# Patient Record
Sex: MALE | URBAN - METROPOLITAN AREA
[De-identification: names, ages, dates, MRNs, and addresses within clinical notes are randomized per-mention and may not be internally consistent; named-entity substitution may affect disease eponyms.]

---

## 2022-07-01 ENCOUNTER — EMERGENCY (EMERGENCY)
Facility: HOSPITAL | Age: 27
LOS: 1 days | Discharge: HOME | End: 2022-07-01
Attending: EMERGENCY MEDICINE | Admitting: EMERGENCY MEDICINE

## 2022-07-01 VITALS
OXYGEN SATURATION: 100 % | SYSTOLIC BLOOD PRESSURE: 124 MMHG | RESPIRATION RATE: 18 BRPM | HEART RATE: 73 BPM | TEMPERATURE: 98 F | DIASTOLIC BLOOD PRESSURE: 78 MMHG

## 2022-07-01 VITALS
HEART RATE: 82 BPM | SYSTOLIC BLOOD PRESSURE: 116 MMHG | RESPIRATION RATE: 16 BRPM | OXYGEN SATURATION: 100 % | DIASTOLIC BLOOD PRESSURE: 74 MMHG

## 2022-07-01 PROCEDURE — 99283 EMERGENCY DEPT VISIT LOW MDM: CPT

## 2022-07-01 NOTE — ED PROVIDER NOTE - PROGRESS NOTE DETAILS
Robert, PGY2 - Patient stable for discharge. Understands the Emergency Room work-up and discharge precautions. Will follow-up with surgeon

## 2022-07-01 NOTE — ED ADULT NURSE NOTE - OBJECTIVE STATEMENT
Break Coverage RN: Received pt in room 7, pt A&Ox4, respirations even and unlabored b/l. Pt s/p piloidal cyst removal on lower back yesterday AM. Pt c/o continuous bleeding at site. Denies dizziness/lightheadedness at this time. Denies pain at this time, reports last took percocet at 7pm. Surgical site pack and dressed, bleeding controlled. Appears in no apparent distress. Awaiting MD ibarra. Will continue to monitor.

## 2022-07-01 NOTE — ED PROVIDER NOTE - ATTENDING CONTRIBUTION TO CARE
The patient is a 26y Male who denies any past medical and surgery history of PTED BIBEMS for the evaluation of wound site= cyst removal to sacral area that was performed in am 6/30 as described  Pt relates that he received inadequate instructions on how to care for the wound and has had bleeding from the site throughout the day today and has pain to the site.    Vital Signs Last 24 Hrs  T(F): 98.5 HR: 73 BP: 124/78 RR: 18 SpO2: 100% (01 Jul 2022 00:48)   PE: as described; my additions and exceptions are noted in the chart    DATA:  EKG: pending at time of evaluation  LAB: Pending at time of evaluation    IMPRESSION/RISK:  Dx= Post op bleeding    Consideration include: Patient bleeding appears controlled can f/u as outpt outer dressing changed  Plan  as above  RTED for renewed bleeding or fever chills

## 2022-07-01 NOTE — ED PROVIDER NOTE - PATIENT PORTAL LINK FT
You can access the FollowMyHealth Patient Portal offered by [facility] by registering at the following website: [email or web address]. By joining [facility]’s FollowMyHealth portal, you will also be able to view your health information using other applications (apps) compatible with our system.

## 2022-07-01 NOTE — ED ADULT NURSE NOTE - NSIMPLEMENTINTERV_GEN_ALL_ED
Implemented All Fall Risk Interventions:  [place] to call system. Call bell, personal items and telephone within reach. Instruct patient to call for assistance. Room bathroom lighting operational. Non-slip footwear when patient is off stretcher. Physically safe environment: no spills, clutter or unnecessary equipment. Stretcher in lowest position, wheels locked, appropriate side rails in place. Provide visual cue, wrist band, yellow gown, etc. Monitor gait and stability. Monitor for mental status changes and reorient to person, place, and time. Review medications for side effects contributing to fall risk. Reinforce activity limits and safety measures with patient and family.

## 2022-07-01 NOTE — ED PROVIDER NOTE - OBJECTIVE STATEMENT
27yo man with PSx pilonidal cyst removal the morning of 6/30/22 at [facility], presenting 2/2 continued bleeding from surgical site. Before [facility] went to a [facility] where they changed the dressing. Patient states that he had felt a bit dizzy seeing his blood but currently denies chest pain, dizziness, sob.

## 2022-07-01 NOTE — ED ADULT NURSE NOTE - CHIEF COMPLAINT QUOTE
pt BIBEMS. At 0630am yesterday, pt had surg at [place] to remove cyst on lower back (sacral area). "I was not given instructions on how to clean it or take care of it." Surgery site bleeding throughout entire day. pt feeling dizzy, lethargic, c/o pain to site. no PMH  Bleeding controlled by bandages

## 2022-07-01 NOTE — ED PROVIDER NOTE - PHYSICAL EXAMINATION
Gen: NAD, AOx3, able to make needs known, non-toxic  Head: NCAT  HEENT: EOMI, normal conjunctiva  Lung: no respiratory distress, speaking in full sentences  CV: pulses bilaterally   MSK: no visible bony deformities  Neuro: No focal sensory or motor deficits  Skin: Warm, well perfused, no rash. 2in long incision, 2 stitches in total, the middle portion is open and packed with gauze, no active bleeding at this time.   Psych: normal affect

## 2022-07-01 NOTE — ED ADULT TRIAGE NOTE - CHIEF COMPLAINT QUOTE
pt BIBEMS. At 0630am yesterday, pt had surg at [place] to remove cyst on lower back (sacral areal). "I was not given instructions on how to clean it or take care of it." Surgery site bleeding throughout entire day. pt feeling dizzy, lethargic, c/o pain to site. no PMH pt BIBEMS. At 0630am yesterday, pt had surg at [place] to remove cyst on lower back (sacral area). "I was not given instructions on how to clean it or take care of it." Surgery site bleeding throughout entire day. pt feeling dizzy, lethargic, c/o pain to site. no PMH  Bleeding controlled by bandages

## 2022-07-01 NOTE — ED PROVIDER NOTE - NS ED ROS FT
GENERAL: No fever, no chills  EYES: No change in vision  HEENT: No trouble swallowing or speaking  CARDIAC: No chest pain  PULMONARY: No cough, no SOB  GI: No abdominal pain, no nausea, no vomiting, no diarrhea, no constipation  : No changes in urination  SKIN: No rashes  NEURO: No headache, no numbness  MSK: No joint pain  Otherwise as HPI or negative.

## 2022-07-01 NOTE — ED PROVIDER NOTE - NSFOLLOWUPINSTRUCTIONS_ED_ALL_ED_FT
Pilonidal Cyst Excision    WHAT YOU NEED TO KNOW:    A pilonidal cyst excision is the removal of a cyst on your lower back that has become infected or abscessed (collection of pus).    DISCHARGE INSTRUCTIONS:    Seek care immediately if:    You have a fever.    You are bleeding from your surgery area.    You are having severe pain.    Your stitches come apart.    Your surgery area is red, hot, or draining pus.  Call your doctor if:    You have questions or concerns about your condition or care.    Medicines: You may need any of the following:    Prescription pain medicine may be given. Ask your healthcare provider how to take this medicine safely. Some prescription pain medicines contain acetaminophen. Do not take other medicines that contain acetaminophen without talking to your healthcare provider. Too much acetaminophen may cause liver damage. Prescription pain medicine may cause constipation. Ask your healthcare provider how to prevent or treat constipation.    Antibiotics may be given to treat infection caused by bacteria.    Take your medicine as directed. Contact your healthcare provider if you think your medicine is not helping or if you have side effects. Tell him or her if you are allergic to any medicine. Keep a list of the medicines, vitamins, and herbs you take. Include the amounts, and when and why you take them. Bring the list or the pill bottles to follow-up visits. Carry your medicine list with you in case of an emergency.  Self-care:    Care for your procedure area as directed. Keep the area clean and dry. Your healthcare provider will give you instructions for changing your bandage. He or she will tell you when you can bathe or shower.    Do not sit for long periods of time. Limit activities that create tension in your lower back.    Shave hair from around the area. Consider laser hair removal after you have healed.  Follow up with your doctor as directed: Write down your questions so you remember to ask them during your visits. You were seen in the Emergency Room today because of bleeding from your surgical site.   Please follow-up with your General Surgeon as soon as possible.     You can take Tylenol and/or as directed for pain.      WHAT YOU NEED TO KNOW:    A pilonidal cyst excision is the removal of a cyst on your lower back that has become infected or abscessed (collection of pus).    DISCHARGE INSTRUCTIONS:  Seek care immediately if:  You have a fever.  You are bleeding from your surgery area.  You are having severe pain.  Your stitches come apart.  Your surgery area is red, hot, or draining pus.    Call your doctor if:  You have questions or concerns about your condition or care.    Medicines: You may need any of the following:    Prescription pain medicine may be given. Ask your healthcare provider how to take this medicine safely. Some prescription pain medicines contain acetaminophen. Do not take other medicines that contain acetaminophen without talking to your healthcare provider. Too much acetaminophen may cause liver damage. Prescription pain medicine may cause constipation. Ask your healthcare provider how to prevent or treat constipation.    Antibiotics may be given to treat infection caused by bacteria.    Take your medicine as directed. Contact your healthcare provider if you think your medicine is not helping or if you have side effects. Tell him or her if you are allergic to any medicine. Keep a list of the medicines, vitamins, and herbs you take. Include the amounts, and when and why you take them. Bring the list or the pill bottles to follow-up visits. Carry your medicine list with you in case of an emergency.  Self-care:    Care for your procedure area as directed. Keep the area clean and dry. Your healthcare provider will give you instructions for changing your bandage. He or she will tell you when you can bathe or shower.    Do not sit for long periods of time. Limit activities that create tension in your lower back.    Shave hair from around the area. Consider laser hair removal after you have healed.  Follow up with your doctor as directed: Write down your questions so you remember to ask them during your visits.

## 2022-07-03 ENCOUNTER — EMERGENCY (EMERGENCY)
Facility: HOSPITAL | Age: 27
LOS: 1 days | Discharge: HOME | End: 2022-07-03
Attending: EMERGENCY MEDICINE
Payer: MEDICAID

## 2022-07-03 VITALS
WEIGHT: 220.02 LBS | SYSTOLIC BLOOD PRESSURE: 131 MMHG | OXYGEN SATURATION: 96 % | HEIGHT: 70 IN | RESPIRATION RATE: 16 BRPM | DIASTOLIC BLOOD PRESSURE: 74 MMHG | TEMPERATURE: 98 F | HEART RATE: 72 BPM

## 2022-07-03 VITALS
TEMPERATURE: 99 F | DIASTOLIC BLOOD PRESSURE: 85 MMHG | HEART RATE: 55 BPM | SYSTOLIC BLOOD PRESSURE: 115 MMHG | OXYGEN SATURATION: 100 % | RESPIRATION RATE: 18 BRPM

## 2022-07-03 DIAGNOSIS — Z98.890 OTHER SPECIFIED POSTPROCEDURAL STATES: Chronic | ICD-10-CM

## 2022-07-03 PROCEDURE — 99283 EMERGENCY DEPT VISIT LOW MDM: CPT

## 2022-07-03 RX ORDER — IBUPROFEN 200 MG
600 TABLET ORAL ONCE
Refills: 0 | Status: COMPLETED | OUTPATIENT
Start: 2022-07-03 | End: 2022-07-03

## 2022-07-03 RX ORDER — ACETAMINOPHEN 500 MG
650 TABLET ORAL ONCE
Refills: 0 | Status: COMPLETED | OUTPATIENT
Start: 2022-07-03 | End: 2022-07-03

## 2022-07-03 RX ADMIN — Medication 600 MILLIGRAM(S): at 18:01

## 2022-07-03 RX ADMIN — Medication 650 MILLIGRAM(S): at 18:01

## 2022-07-03 NOTE — ED PROVIDER NOTE - NS ED MD DISPO DISCHARGE CCDA
Mildly to Moderately Impaired: difficulty hearing in some environments or speaker may need to increase volume or speak distinctly Patient/Caregiver provided printed discharge information.

## 2022-07-03 NOTE — ED PROVIDER NOTE - OBJECTIVE STATEMENT
Patient is a 25 y/o M who is s/p pilonidal cyst repair Patient is a 27 y/o M who is s/p pilonidal cyst repair on 6/30 at [facility] presenting to the ED with bleeding from surgical site. On same day of surgery went to [facility] and [facility] for pain and bleeding. Packing was done at both facilities. States he has not been able to change packing as he did not have supplied. Was able to change overlaying gauze and tape 1x. Noticed more bleeding from site. Very tender to touch. No fever, chills, n/v, urinary changes. Did defecate today with some pain. Not taking any pain meds.

## 2022-07-03 NOTE — ED PROVIDER NOTE - ATTENDING CONTRIBUTION TO CARE
------------ATTENDING NOTE------------  pt c/o continued constant mild/moderate ache to surgical site from recent pilonidal cyst removal, pain worse w/ dressing changes and sitting, no fevers or infectious symptoms, surgical site well appearing in ED, otherwise benign exam, nml VS, in depth dw pt about ddx, tx, scott, continued close outpt fu.  - Abhijit Daigle MD   ----------------------------------------------

## 2022-07-03 NOTE — ED PROVIDER NOTE - NSFOLLOWUPCLINICS_GEN_ALL_ED_FT
[facility]  General Surgery  [address] [place] [address] - [address] [place] [address]  Phone: [phone]  Fax:

## 2022-07-03 NOTE — ED PROVIDER NOTE - PROGRESS NOTE DETAILS
Edgar Delacruz MD (PGY2): Wound repacked and covcered. Patient concerned about ability to repack wound. No social work on weekends. Considered having patient stay in CDU for pain control and wound management./education. However, not CDU candidate at this time per provider. Will provider patient with ample supplies for wound packing and dressings. Strict return precautions discussed. Wound care and pain control discussed in detail.

## 2022-07-03 NOTE — ED ADULT NURSE NOTE - OBJECTIVE STATEMENT
27 yo M presents to ED A+Ox3 via EMS c/o bleeding at surgical site. Patient had cyst to lower back removed at another hospital on 6/30. States following the procedure, he was seen at 2 other ED, had the site evaluated and a packed dressing placed at [facility] ED which was the last time the dressing was changed. States this morning after getting up from a prone position, he developed an increase in pain and began to have bloody drainage from the site. States he placed a pad on top of the packing but continued to have bleeding at the site. Denies fever, chills. Breathing spontaneous and unlabored on room air. Skin warm dry and of color appropriate for ethnicity. Packing noted to lower back at surgical site, no active drainage noted. Bed in lowest position, side rails up. Patient in position of comfort.

## 2022-07-03 NOTE — ED PROVIDER NOTE - NS ED ROS FT
CONSTITUTIONAL: No fevers, no chills, no lightheadedness, no dizziness  EYES: no visual changes, no eye pain  EARS: no ear drainage, no ear pain, no change in hearing  NOSE: no nasal congestion  MOUTH/THROAT: no sore throat  CV: No chest pain, no palpitations  RESP: No SOB, no cough  GI: No n/v/d, no abd pain  : no dysuria, no hematuria, no flank pain  MSK: see hpi   SKIN: no rashes  NEURO: no headache, no focal weakness, no decreased sensation/parasthesias   PSYCHIATRIC: no known mental health issues

## 2022-07-03 NOTE — ED PROVIDER NOTE - CLINICAL SUMMARY MEDICAL DECISION MAKING FREE TEXT BOX
27 y/o M p/w pain and bleeding from pilonidal cyst repair site on 6/30. No packing change or pain meds since 6/30. Vitals stable. Exam w/o signs of systemic infx. Will provide pain control. Will redress and repack. Will provide packing supplies. General surgery follow-up.

## 2022-07-03 NOTE — ED PROVIDER NOTE - CARE PLAN
Principal Discharge DX:	Pilonidal cyst   1 Principal Discharge DX:	S/P surgical removal of pilonidal cyst  Secondary Diagnosis:	Encounter for postoperative wound check

## 2022-07-03 NOTE — ED PROVIDER NOTE - PATIENT PORTAL LINK FT
You can access the FollowMyHealth Patient Portal offered by [facility] by registering at the following website: [email or web address]. By joining [facility]’s FollowMyHealth portal, you will also be able to view your health information using other applications (apps) compatible with our system.

## 2022-07-03 NOTE — ED PROVIDER NOTE - PHYSICAL EXAMINATION
General: Alert and Orientated x 3. No apparent distress.  Head: Normocephalic and atraumatic.  Eyes: PERRLA with EOMI.  Neck: Supple. Trachea midline.   Cardiac: Normal S1 and S2 w/ RRR. No murmurs appreciated.   Pulmonary: CTA bilaterally. No increased WOB. No wheezes or crackles.  Abdominal: Soft, non-tender. (+) bowel sounds appreciated in all 4 quadrants. No hepatosplenomegaly.   Neurologic: No focal sensory or motor deficits.  Musculoskeletal: Strength appropriate in all 4 extremities for age with no limited ROM.  Back: 3cm long and 1cm wide incision on upper buttock with packing in place. Very TTP.   Skin: Color appropriate for race. Intact, warm, and well-perfused.  Psychiatric: Appropriate mood and affect. No apparent risk to self or others.

## 2022-07-03 NOTE — ED PROVIDER NOTE - NSFOLLOWUPINSTRUCTIONS_ED_ALL_ED_FT
See your Surgery Team this week for follow up -- call to discuss.    Keep wound clean and dry, continue packing changes as previously directed.    See PILONIDAL ABSCESS - INCISION & DRAINAGE information and return instructions given to you.    Seek immediate medical care for new/worsening symptoms/concerns.

## 2022-10-06 PROBLEM — Z78.9 OTHER SPECIFIED HEALTH STATUS: Chronic | Status: ACTIVE | Noted: 2022-07-01

## 2023-07-22 ENCOUNTER — INPATIENT (INPATIENT)
Facility: HOSPITAL | Age: 28
LOS: 1 days | Discharge: HOME | DRG: 605 | End: 2023-07-24
Attending: SURGERY | Admitting: SURGERY
Payer: MEDICAID

## 2023-07-22 VITALS — HEART RATE: 113 BPM | TEMPERATURE: 99 F | WEIGHT: 221.56 LBS | OXYGEN SATURATION: 100 %

## 2023-07-22 DIAGNOSIS — S41.111A LACERATION WITHOUT FOREIGN BODY OF RIGHT UPPER ARM, INITIAL ENCOUNTER: ICD-10-CM

## 2023-07-22 LAB
ALBUMIN SERPL ELPH-MCNC: 2.8 G/DL — LOW (ref 3.3–5)
ALP SERPL-CCNC: 43 U/L — SIGNIFICANT CHANGE UP (ref 40–120)
ALT FLD-CCNC: 18 U/L — SIGNIFICANT CHANGE UP (ref 10–45)
AMPHET UR-MCNC: NEGATIVE — SIGNIFICANT CHANGE UP
ANION GAP SERPL CALC-SCNC: 16 MMOL/L — SIGNIFICANT CHANGE UP (ref 5–17)
ANION GAP SERPL CALC-SCNC: 16 MMOL/L — SIGNIFICANT CHANGE UP (ref 5–17)
APTT BLD: 33.7 SEC — SIGNIFICANT CHANGE UP (ref 27.5–35.5)
AST SERPL-CCNC: 13 U/L — SIGNIFICANT CHANGE UP (ref 10–40)
BARBITURATES UR SCN-MCNC: NEGATIVE — SIGNIFICANT CHANGE UP
BASE EXCESS BLDV CALC-SCNC: -4.2 MMOL/L — LOW (ref -2–3)
BASOPHILS # BLD AUTO: 0.07 K/UL — SIGNIFICANT CHANGE UP (ref 0–0.2)
BASOPHILS NFR BLD AUTO: 0.9 % — SIGNIFICANT CHANGE UP (ref 0–2)
BENZODIAZ UR-MCNC: POSITIVE
BILIRUB SERPL-MCNC: <0.1 MG/DL — LOW (ref 0.2–1.2)
BUN SERPL-MCNC: 12 MG/DL — SIGNIFICANT CHANGE UP (ref 7–23)
BUN SERPL-MCNC: 9 MG/DL — SIGNIFICANT CHANGE UP (ref 7–23)
CA-I SERPL-SCNC: 1.17 MMOL/L — SIGNIFICANT CHANGE UP (ref 1.15–1.33)
CALCIUM SERPL-MCNC: 5.6 MG/DL — CRITICAL LOW (ref 8.4–10.5)
CALCIUM SERPL-MCNC: 8.7 MG/DL — SIGNIFICANT CHANGE UP (ref 8.4–10.5)
CHLORIDE BLDV-SCNC: 106 MMOL/L — SIGNIFICANT CHANGE UP (ref 96–108)
CHLORIDE SERPL-SCNC: 105 MMOL/L — SIGNIFICANT CHANGE UP (ref 96–108)
CHLORIDE SERPL-SCNC: 119 MMOL/L — HIGH (ref 96–108)
CO2 BLDV-SCNC: 24 MMOL/L — SIGNIFICANT CHANGE UP (ref 22–26)
CO2 SERPL-SCNC: 13 MMOL/L — LOW (ref 22–31)
CO2 SERPL-SCNC: 20 MMOL/L — LOW (ref 22–31)
COCAINE METAB.OTHER UR-MCNC: NEGATIVE — SIGNIFICANT CHANGE UP
CREAT SERPL-MCNC: 0.83 MG/DL — SIGNIFICANT CHANGE UP (ref 0.5–1.3)
CREAT SERPL-MCNC: 1.03 MG/DL — SIGNIFICANT CHANGE UP (ref 0.5–1.3)
EGFR: 103 ML/MIN/1.73M2 — SIGNIFICANT CHANGE UP
EGFR: 124 ML/MIN/1.73M2 — SIGNIFICANT CHANGE UP
EOSINOPHIL # BLD AUTO: 0.21 K/UL — SIGNIFICANT CHANGE UP (ref 0–0.5)
EOSINOPHIL NFR BLD AUTO: 2.6 % — SIGNIFICANT CHANGE UP (ref 0–6)
ETHANOL SERPL-MCNC: 143 MG/DL — HIGH (ref 0–10)
GAS PNL BLDA: SIGNIFICANT CHANGE UP
GAS PNL BLDV: 139 MMOL/L — SIGNIFICANT CHANGE UP (ref 136–145)
GAS PNL BLDV: SIGNIFICANT CHANGE UP
GAS PNL BLDV: SIGNIFICANT CHANGE UP
GLUCOSE BLDV-MCNC: 103 MG/DL — HIGH (ref 70–99)
GLUCOSE SERPL-MCNC: 109 MG/DL — HIGH (ref 70–99)
GLUCOSE SERPL-MCNC: 87 MG/DL — SIGNIFICANT CHANGE UP (ref 70–99)
HCO3 BLDV-SCNC: 23 MMOL/L — SIGNIFICANT CHANGE UP (ref 22–29)
HCT VFR BLD CALC: 34.4 % — LOW (ref 39–50)
HCT VFR BLD CALC: 42.7 % — SIGNIFICANT CHANGE UP (ref 39–50)
HCT VFR BLDA CALC: 43 % — SIGNIFICANT CHANGE UP (ref 39–51)
HGB BLD CALC-MCNC: 14.4 G/DL — SIGNIFICANT CHANGE UP (ref 12.6–17.4)
HGB BLD-MCNC: 11.2 G/DL — LOW (ref 13–17)
HGB BLD-MCNC: 13.7 G/DL — SIGNIFICANT CHANGE UP (ref 13–17)
HOROWITZ INDEX BLDV+IHG-RTO: 50 — SIGNIFICANT CHANGE UP
INR BLD: 2.2 RATIO — HIGH (ref 0.88–1.16)
LACTATE BLDV-MCNC: 3.3 MMOL/L — HIGH (ref 0.5–2)
LYMPHOCYTES # BLD AUTO: 2.44 K/UL — SIGNIFICANT CHANGE UP (ref 1–3.3)
LYMPHOCYTES # BLD AUTO: 30.7 % — SIGNIFICANT CHANGE UP (ref 13–44)
MAGNESIUM SERPL-MCNC: 1.8 MG/DL — SIGNIFICANT CHANGE UP (ref 1.6–2.6)
MANUAL SMEAR VERIFICATION: SIGNIFICANT CHANGE UP
MCHC RBC-ENTMCNC: 28.8 PG — SIGNIFICANT CHANGE UP (ref 27–34)
MCHC RBC-ENTMCNC: 29.5 PG — SIGNIFICANT CHANGE UP (ref 27–34)
MCHC RBC-ENTMCNC: 32.1 GM/DL — SIGNIFICANT CHANGE UP (ref 32–36)
MCHC RBC-ENTMCNC: 32.6 GM/DL — SIGNIFICANT CHANGE UP (ref 32–36)
MCV RBC AUTO: 89.7 FL — SIGNIFICANT CHANGE UP (ref 80–100)
MCV RBC AUTO: 90.5 FL — SIGNIFICANT CHANGE UP (ref 80–100)
METHADONE UR-MCNC: NEGATIVE — SIGNIFICANT CHANGE UP
MONOCYTES # BLD AUTO: 0.35 K/UL — SIGNIFICANT CHANGE UP (ref 0–0.9)
MONOCYTES NFR BLD AUTO: 4.4 % — SIGNIFICANT CHANGE UP (ref 2–14)
NEUTROPHILS # BLD AUTO: 4.88 K/UL — SIGNIFICANT CHANGE UP (ref 1.8–7.4)
NEUTROPHILS NFR BLD AUTO: 61.4 % — SIGNIFICANT CHANGE UP (ref 43–77)
NRBC # BLD: 0 /100 WBCS — SIGNIFICANT CHANGE UP (ref 0–0)
OPIATES UR-MCNC: NEGATIVE — SIGNIFICANT CHANGE UP
OXYCODONE UR-MCNC: NEGATIVE — SIGNIFICANT CHANGE UP
PCO2 BLDV: 47 MMHG — SIGNIFICANT CHANGE UP (ref 42–55)
PCP SPEC-MCNC: SIGNIFICANT CHANGE UP
PCP UR-MCNC: NEGATIVE — SIGNIFICANT CHANGE UP
PH BLDV: 7.29 — LOW (ref 7.32–7.43)
PHOSPHATE SERPL-MCNC: 4.8 MG/DL — HIGH (ref 2.5–4.5)
PLAT MORPH BLD: NORMAL — SIGNIFICANT CHANGE UP
PLATELET # BLD AUTO: 249 K/UL — SIGNIFICANT CHANGE UP (ref 150–400)
PLATELET # BLD AUTO: 279 K/UL — SIGNIFICANT CHANGE UP (ref 150–400)
PO2 BLDV: 72 MMHG — HIGH (ref 25–45)
POTASSIUM BLDV-SCNC: 3.5 MMOL/L — SIGNIFICANT CHANGE UP (ref 3.5–5.1)
POTASSIUM SERPL-MCNC: 2.2 MMOL/L — CRITICAL LOW (ref 3.5–5.3)
POTASSIUM SERPL-MCNC: 3.6 MMOL/L — SIGNIFICANT CHANGE UP (ref 3.5–5.3)
POTASSIUM SERPL-SCNC: 2.2 MMOL/L — CRITICAL LOW (ref 3.5–5.3)
POTASSIUM SERPL-SCNC: 3.6 MMOL/L — SIGNIFICANT CHANGE UP (ref 3.5–5.3)
PROT SERPL-MCNC: 4.2 G/DL — LOW (ref 6–8.3)
PROTHROM AB SERPL-ACNC: 25.7 SEC — HIGH (ref 10.5–13.4)
RBC # BLD: 3.8 M/UL — LOW (ref 4.2–5.8)
RBC # BLD: 4.76 M/UL — SIGNIFICANT CHANGE UP (ref 4.2–5.8)
RBC # FLD: 13.5 % — SIGNIFICANT CHANGE UP (ref 10.3–14.5)
RBC # FLD: 13.5 % — SIGNIFICANT CHANGE UP (ref 10.3–14.5)
RBC BLD AUTO: NORMAL — SIGNIFICANT CHANGE UP
SAO2 % BLDV: 93.9 % — HIGH (ref 67–88)
SODIUM SERPL-SCNC: 141 MMOL/L — SIGNIFICANT CHANGE UP (ref 135–145)
SODIUM SERPL-SCNC: 148 MMOL/L — HIGH (ref 135–145)
THC UR QL: NEGATIVE — SIGNIFICANT CHANGE UP
WBC # BLD: 12.34 K/UL — HIGH (ref 3.8–10.5)
WBC # BLD: 7.95 K/UL — SIGNIFICANT CHANGE UP (ref 3.8–10.5)
WBC # FLD AUTO: 12.34 K/UL — HIGH (ref 3.8–10.5)
WBC # FLD AUTO: 7.95 K/UL — SIGNIFICANT CHANGE UP (ref 3.8–10.5)

## 2023-07-22 PROCEDURE — 36620 INSERTION CATHETER ARTERY: CPT

## 2023-07-22 PROCEDURE — 99291 CRITICAL CARE FIRST HOUR: CPT | Mod: 25

## 2023-07-22 PROCEDURE — 31500 INSERT EMERGENCY AIRWAY: CPT

## 2023-07-22 PROCEDURE — 73130 X-RAY EXAM OF HAND: CPT | Mod: 26,RT

## 2023-07-22 PROCEDURE — 72170 X-RAY EXAM OF PELVIS: CPT | Mod: 26

## 2023-07-22 PROCEDURE — 70450 CT HEAD/BRAIN W/O DYE: CPT | Mod: 26,MD

## 2023-07-22 PROCEDURE — 73110 X-RAY EXAM OF WRIST: CPT | Mod: 26,RT

## 2023-07-22 PROCEDURE — 99222 1ST HOSP IP/OBS MODERATE 55: CPT

## 2023-07-22 PROCEDURE — 72125 CT NECK SPINE W/O DYE: CPT | Mod: 26,MD

## 2023-07-22 PROCEDURE — 71045 X-RAY EXAM CHEST 1 VIEW: CPT | Mod: 26

## 2023-07-22 PROCEDURE — 71045 X-RAY EXAM CHEST 1 VIEW: CPT | Mod: 26,77

## 2023-07-22 PROCEDURE — 73090 X-RAY EXAM OF FOREARM: CPT | Mod: 26,LT

## 2023-07-22 RX ORDER — ACETAMINOPHEN 500 MG
1000 TABLET ORAL ONCE
Refills: 0 | Status: COMPLETED | OUTPATIENT
Start: 2023-07-22 | End: 2023-07-22

## 2023-07-22 RX ORDER — ETOMIDATE 2 MG/ML
30 INJECTION INTRAVENOUS ONCE
Refills: 0 | Status: COMPLETED | OUTPATIENT
Start: 2023-07-22 | End: 2023-07-22

## 2023-07-22 RX ORDER — CHLORHEXIDINE GLUCONATE 213 G/1000ML
1 SOLUTION TOPICAL
Refills: 0 | Status: DISCONTINUED | OUTPATIENT
Start: 2023-07-22 | End: 2023-07-23

## 2023-07-22 RX ORDER — DEXMEDETOMIDINE HYDROCHLORIDE IN 0.9% SODIUM CHLORIDE 4 UG/ML
1 INJECTION INTRAVENOUS
Qty: 200 | Refills: 0 | Status: DISCONTINUED | OUTPATIENT
Start: 2023-07-22 | End: 2023-07-22

## 2023-07-22 RX ORDER — TRAMADOL HYDROCHLORIDE 50 MG/1
25 TABLET ORAL EVERY 4 HOURS
Refills: 0 | Status: DISCONTINUED | OUTPATIENT
Start: 2023-07-22 | End: 2023-07-24

## 2023-07-22 RX ORDER — PHENYLEPHRINE HYDROCHLORIDE 10 MG/ML
0.5 INJECTION INTRAVENOUS
Qty: 40 | Refills: 0 | Status: DISCONTINUED | OUTPATIENT
Start: 2023-07-22 | End: 2023-07-22

## 2023-07-22 RX ORDER — CEFAZOLIN SODIUM 1 G
VIAL (EA) INJECTION
Refills: 0 | Status: DISCONTINUED | OUTPATIENT
Start: 2023-07-22 | End: 2023-07-24

## 2023-07-22 RX ORDER — MIDAZOLAM HYDROCHLORIDE 1 MG/ML
4 INJECTION, SOLUTION INTRAMUSCULAR; INTRAVENOUS ONCE
Refills: 0 | Status: DISCONTINUED | OUTPATIENT
Start: 2023-07-22 | End: 2023-07-22

## 2023-07-22 RX ORDER — SODIUM CHLORIDE 9 MG/ML
1000 INJECTION, SOLUTION INTRAVENOUS ONCE
Refills: 0 | Status: COMPLETED | OUTPATIENT
Start: 2023-07-22 | End: 2023-07-22

## 2023-07-22 RX ORDER — TETANUS TOXOID, REDUCED DIPHTHERIA TOXOID AND ACELLULAR PERTUSSIS VACCINE, ADSORBED 5; 2.5; 8; 8; 2.5 [IU]/.5ML; [IU]/.5ML; UG/.5ML; UG/.5ML; UG/.5ML
0.5 SUSPENSION INTRAMUSCULAR ONCE
Refills: 0 | Status: COMPLETED | OUTPATIENT
Start: 2023-07-22 | End: 2023-07-22

## 2023-07-22 RX ORDER — ACETAMINOPHEN 500 MG
650 TABLET ORAL EVERY 6 HOURS
Refills: 0 | Status: DISCONTINUED | OUTPATIENT
Start: 2023-07-22 | End: 2023-07-23

## 2023-07-22 RX ORDER — CEFAZOLIN SODIUM 1 G
2000 VIAL (EA) INJECTION ONCE
Refills: 0 | Status: COMPLETED | OUTPATIENT
Start: 2023-07-22 | End: 2023-07-22

## 2023-07-22 RX ORDER — ROCURONIUM BROMIDE 10 MG/ML
100 VIAL (ML) INTRAVENOUS ONCE
Refills: 0 | Status: COMPLETED | OUTPATIENT
Start: 2023-07-22 | End: 2023-07-22

## 2023-07-22 RX ORDER — HYDROMORPHONE HYDROCHLORIDE 2 MG/ML
1 INJECTION INTRAMUSCULAR; INTRAVENOUS; SUBCUTANEOUS
Refills: 0 | Status: DISCONTINUED | OUTPATIENT
Start: 2023-07-22 | End: 2023-07-22

## 2023-07-22 RX ORDER — POTASSIUM CHLORIDE 20 MEQ
10 PACKET (EA) ORAL
Refills: 0 | Status: COMPLETED | OUTPATIENT
Start: 2023-07-22 | End: 2023-07-22

## 2023-07-22 RX ORDER — CHLORHEXIDINE GLUCONATE 213 G/1000ML
15 SOLUTION TOPICAL EVERY 12 HOURS
Refills: 0 | Status: DISCONTINUED | OUTPATIENT
Start: 2023-07-22 | End: 2023-07-22

## 2023-07-22 RX ORDER — THIAMINE MONONITRATE (VIT B1) 100 MG
100 TABLET ORAL DAILY
Refills: 0 | Status: DISCONTINUED | OUTPATIENT
Start: 2023-07-22 | End: 2023-07-23

## 2023-07-22 RX ORDER — ONDANSETRON 8 MG/1
4 TABLET, FILM COATED ORAL ONCE
Refills: 0 | Status: COMPLETED | OUTPATIENT
Start: 2023-07-22 | End: 2023-07-22

## 2023-07-22 RX ORDER — ROCURONIUM BROMIDE 10 MG/ML
75 VIAL (ML) INTRAVENOUS ONCE
Refills: 0 | Status: COMPLETED | OUTPATIENT
Start: 2023-07-22 | End: 2023-07-22

## 2023-07-22 RX ORDER — HYDROMORPHONE HYDROCHLORIDE 2 MG/ML
1 INJECTION INTRAMUSCULAR; INTRAVENOUS; SUBCUTANEOUS ONCE
Refills: 0 | Status: DISCONTINUED | OUTPATIENT
Start: 2023-07-22 | End: 2023-07-22

## 2023-07-22 RX ORDER — CEFAZOLIN SODIUM 1 G
2000 VIAL (EA) INJECTION EVERY 8 HOURS
Refills: 0 | Status: DISCONTINUED | OUTPATIENT
Start: 2023-07-22 | End: 2023-07-24

## 2023-07-22 RX ORDER — PROPOFOL 10 MG/ML
50 INJECTION, EMULSION INTRAVENOUS
Qty: 1000 | Refills: 0 | Status: DISCONTINUED | OUTPATIENT
Start: 2023-07-22 | End: 2023-07-22

## 2023-07-22 RX ORDER — SODIUM CHLORIDE 9 MG/ML
1000 INJECTION, SOLUTION INTRAVENOUS
Refills: 0 | Status: DISCONTINUED | OUTPATIENT
Start: 2023-07-22 | End: 2023-07-22

## 2023-07-22 RX ORDER — DEXMEDETOMIDINE HYDROCHLORIDE IN 0.9% SODIUM CHLORIDE 4 UG/ML
0.4 INJECTION INTRAVENOUS
Qty: 200 | Refills: 0 | Status: DISCONTINUED | OUTPATIENT
Start: 2023-07-22 | End: 2023-07-22

## 2023-07-22 RX ADMIN — PROPOFOL 30.2 MICROGRAM(S)/KG/MIN: 10 INJECTION, EMULSION INTRAVENOUS at 04:05

## 2023-07-22 RX ADMIN — Medication 100 MILLIEQUIVALENT(S): at 08:31

## 2023-07-22 RX ADMIN — PROPOFOL 30.2 MICROGRAM(S)/KG/MIN: 10 INJECTION, EMULSION INTRAVENOUS at 08:31

## 2023-07-22 RX ADMIN — PHENYLEPHRINE HYDROCHLORIDE 18.8 MICROGRAM(S)/KG/MIN: 10 INJECTION INTRAVENOUS at 08:32

## 2023-07-22 RX ADMIN — HYDROMORPHONE HYDROCHLORIDE 1 MILLIGRAM(S): 2 INJECTION INTRAMUSCULAR; INTRAVENOUS; SUBCUTANEOUS at 09:00

## 2023-07-22 RX ADMIN — HYDROMORPHONE HYDROCHLORIDE 1 MILLIGRAM(S): 2 INJECTION INTRAMUSCULAR; INTRAVENOUS; SUBCUTANEOUS at 06:24

## 2023-07-22 RX ADMIN — TRAMADOL HYDROCHLORIDE 25 MILLIGRAM(S): 50 TABLET ORAL at 22:44

## 2023-07-22 RX ADMIN — MIDAZOLAM HYDROCHLORIDE 4 MILLIGRAM(S): 1 INJECTION, SOLUTION INTRAMUSCULAR; INTRAVENOUS at 03:18

## 2023-07-22 RX ADMIN — ETOMIDATE 30 MILLIGRAM(S): 2 INJECTION INTRAVENOUS at 02:48

## 2023-07-22 RX ADMIN — Medication 100 MILLIGRAM(S): at 13:24

## 2023-07-22 RX ADMIN — Medication 100 MILLIGRAM(S): at 05:01

## 2023-07-22 RX ADMIN — HYDROMORPHONE HYDROCHLORIDE 1 MILLIGRAM(S): 2 INJECTION INTRAMUSCULAR; INTRAVENOUS; SUBCUTANEOUS at 04:04

## 2023-07-22 RX ADMIN — MIDAZOLAM HYDROCHLORIDE 4 MILLIGRAM(S): 1 INJECTION, SOLUTION INTRAMUSCULAR; INTRAVENOUS at 02:52

## 2023-07-22 RX ADMIN — Medication 75 MILLIGRAM(S): at 03:20

## 2023-07-22 RX ADMIN — Medication 400 MILLIGRAM(S): at 23:55

## 2023-07-22 RX ADMIN — SODIUM CHLORIDE 1000 MILLILITER(S): 9 INJECTION, SOLUTION INTRAVENOUS at 04:59

## 2023-07-22 RX ADMIN — Medication 100 MILLIGRAM(S): at 21:13

## 2023-07-22 RX ADMIN — TRAMADOL HYDROCHLORIDE 25 MILLIGRAM(S): 50 TABLET ORAL at 22:14

## 2023-07-22 RX ADMIN — HYDROMORPHONE HYDROCHLORIDE 1 MILLIGRAM(S): 2 INJECTION INTRAMUSCULAR; INTRAVENOUS; SUBCUTANEOUS at 12:01

## 2023-07-22 RX ADMIN — HYDROMORPHONE HYDROCHLORIDE 1 MILLIGRAM(S): 2 INJECTION INTRAMUSCULAR; INTRAVENOUS; SUBCUTANEOUS at 06:23

## 2023-07-22 RX ADMIN — HYDROMORPHONE HYDROCHLORIDE 1 MILLIGRAM(S): 2 INJECTION INTRAMUSCULAR; INTRAVENOUS; SUBCUTANEOUS at 12:15

## 2023-07-22 RX ADMIN — CHLORHEXIDINE GLUCONATE 1 APPLICATION(S): 213 SOLUTION TOPICAL at 06:24

## 2023-07-22 RX ADMIN — CHLORHEXIDINE GLUCONATE 15 MILLILITER(S): 213 SOLUTION TOPICAL at 06:23

## 2023-07-22 RX ADMIN — Medication 100 MILLIEQUIVALENT(S): at 06:25

## 2023-07-22 RX ADMIN — DEXMEDETOMIDINE HYDROCHLORIDE IN 0.9% SODIUM CHLORIDE 25.1 MICROGRAM(S)/KG/HR: 4 INJECTION INTRAVENOUS at 12:45

## 2023-07-22 RX ADMIN — Medication 100 MILLIGRAM(S): at 12:01

## 2023-07-22 RX ADMIN — ONDANSETRON 4 MILLIGRAM(S): 8 TABLET, FILM COATED ORAL at 15:35

## 2023-07-22 RX ADMIN — Medication 100 MILLIEQUIVALENT(S): at 05:00

## 2023-07-22 RX ADMIN — SODIUM CHLORIDE 125 MILLILITER(S): 9 INJECTION, SOLUTION INTRAVENOUS at 08:32

## 2023-07-22 RX ADMIN — HYDROMORPHONE HYDROCHLORIDE 1 MILLIGRAM(S): 2 INJECTION INTRAMUSCULAR; INTRAVENOUS; SUBCUTANEOUS at 06:04

## 2023-07-22 RX ADMIN — Medication 100 MILLIGRAM(S): at 02:48

## 2023-07-22 RX ADMIN — HYDROMORPHONE HYDROCHLORIDE 1 MILLIGRAM(S): 2 INJECTION INTRAMUSCULAR; INTRAVENOUS; SUBCUTANEOUS at 09:15

## 2023-07-22 RX ADMIN — PROPOFOL 30.2 MICROGRAM(S)/KG/MIN: 10 INJECTION, EMULSION INTRAVENOUS at 05:00

## 2023-07-22 RX ADMIN — PHENYLEPHRINE HYDROCHLORIDE 18.8 MICROGRAM(S)/KG/MIN: 10 INJECTION INTRAVENOUS at 06:25

## 2023-07-22 NOTE — H&P ADULT - CRITICAL CARE ATTENDING COMMENT
Level one trauma alert for acute hemorrhage - patient had tourniquet on left arm and significant blood loss reported at scene from laceration to right mid arm  ultimately had to be intubated for combativeness - to facilitate management including establishing I.V. access and to address site of hemorrhage  bleeding controlled at bedside  brought  for CT imaging of head which was negative then to SICU  hand off by myself to SICU team

## 2023-07-22 NOTE — CONSULT NOTE ADULT - ATTENDING COMMENTS
25 year-old male lvl 1 trauma presenting after punching a glass window and sustaining a R forearm laceration, tourniquet placed by EMS for c/f arterial bleed.   Intubated in ER for airway protection, was combative Tourniquet removed, R ventral forearm with 6cm laceration to subcutaneous tissues, x5 additional 1-2cm lacerations, palpable radial/ulnar/DP/PT bilaterally. Also some small R knee and L anterior shin abrasions. S/p 2L bolus,    Intubated paralysed, tachycardiac, will deeply sedate with Propofol, prn Dilaudid for pain. CTH and C spine neg for acute path  Vent and ETT adjustment, tip very high up. F/U CXR ordered.  Hypotensive, a line placed, pt is intravascular depleted with resp variation on A line tracing, responded to fluid bolus  Replace K  Hb improved to 13.7 likely hemoconcentrated, continue to monitor  Abx Cefazolin, rt UE wound sutured and secured  Alco +, add Thiamine  Can start pharm DVT ppx  Trauma scans P

## 2023-07-22 NOTE — ED ADULT NURSE NOTE - NS ED NURSE TRANSPORT WITH
EDT, RN, MD, Resp./Cardiac Monitor/Defib/ACLS/Rescue Kit/O2/BVM/oxygen/pulse ox/ventilator/ACLS Rescue Kit

## 2023-07-22 NOTE — CONSULT NOTE ADULT - ASSESSMENT
ASSESSMENT: 25 year-old male lvl 1 trauma presenting after punching a glass window and sustaining a R forearm laceration, tourniquet placed by EMS for c/f arterial bleed. Upon arrival was combative and thus sedated and intubated, GCS 14. Tourniquet removed, R ventral forearm with 6cm laceration to subcutaneous tissues, x5 additional 1-2cm lacerations, palpable radial/ulnar/DP/PT bilaterally. Also some small R knee and L anterior shin abrasions. S/p 2L bolus, HDS. SICU consulted for intubated/sedated state and higher care requirements. Bedside irrigation and closure of R forearm lacs performed by trauma surgery.    PLAN:   Neurologic:  - Agitated and combative expressing suicidal ideation upon arrival, now sedated/intubated  - Sedation: Propofol 50  - Pain: dilaudid prn    Respiratory:  - PRVC 18/500/5/50  - Initial CXR seemingly wnl, FU read    Cardiovascular:  - No pressors cliff  - HDS, SBP 90-100s  - L radial Starr placed, MAP goal >65  - Lactate 4.8 > 3.3, monitor    Gastrointestinal/Nutrition:  - NPO    Genitourinary/Renal:  - No mIVF cliff, s/p 2L bolus  - Ravi in  - K 2.2, repleting    Hematologic:  - Hgb 11.2 > 13.7, stable  - Low threshold for 1u pRBC  - Monitor H/H  - Holding DVT ppx for now    Infectious Disease:  - Ancef (7/22 - ?)  - Monitor signs of infection    Endocrine:  - Continuous finger sticks    MSK:  - R ventral forearm with 6cm laceration to subcutaneous tissues, x5 additional 1-2cm lacerations, palpable radial/ulnar/DP/PT bilaterally, now s/p irrigation + primary closure  - FU AP pelvis + R forearm XR reads, R wrist and hand XR ordered    Disposition: SICU ASSESSMENT: 25 year-old male lvl 1 trauma presenting after punching a glass window and sustaining a R forearm laceration, tourniquet placed by EMS for c/f arterial bleed. Upon arrival was combative and thus sedated and intubated, GCS 14. Tourniquet removed, R ventral forearm with 6cm laceration to subcutaneous tissues, x5 additional 1-2cm lacerations, palpable radial/ulnar/DP/PT bilaterally. Also some small R knee and L anterior shin abrasions. S/p 2L bolus, HDS. SICU consulted for intubated/sedated state and higher care requirements. Bedside irrigation and closure of R forearm lacs performed by trauma surgery.    PLAN:   Neurologic:  - Agitated and combative expressing suicidal ideation upon arrival, now sedated/intubated  - Sedation: Propofol 50  - Pain: dilaudid prn  - FU CTh + CTcspine read    Respiratory:  - PRVC 18/500/5/50  - Initial CXR seemingly wnl, FU read    Cardiovascular:  - No pressors cliff  - HDS, SBP 90-100s  - L radial Starr placed, MAP goal >65  - Lactate 4.8 > 3.3, monitor    Gastrointestinal/Nutrition:  - NPO    Genitourinary/Renal:  - No mIVF cliff, s/p 2L bolus  - Ravi in  - K 2.2, repleting    Hematologic:  - Hgb 11.2 > 13.7, stable  - Low threshold for 1u pRBC  - Monitor H/H  - Holding DVT ppx for now    Infectious Disease:  - Ancef (7/22 - ?)  - Monitor signs of infection    Endocrine:  - Continuous finger sticks    MSK:  - R ventral forearm with 6cm laceration to subcutaneous tissues, x5 additional 1-2cm lacerations, palpable radial/ulnar/DP/PT bilaterally, now s/p irrigation + primary closure  - FU AP pelvis + R forearm XR reads, R wrist and hand XR ordered    Disposition: SICU ASSESSMENT: 25 year-old male lvl 1 trauma presenting after punching a glass window and sustaining a R forearm laceration, tourniquet placed by EMS for c/f arterial bleed. Upon arrival was combative and thus sedated and intubated, GCS 14. Tourniquet removed, R ventral forearm with 6cm laceration to subcutaneous tissues, x5 additional 1-2cm lacerations, palpable radial/ulnar/DP/PT bilaterally. Also some small R knee and L anterior shin abrasions. S/p 2L bolus, HDS. SICU consulted for intubated/sedated state and higher care requirements. Bedside irrigation and closure of R forearm lacs performed by trauma surgery.    PLAN:   Neurologic:  - Agitated and combative expressing suicidal ideation upon arrival, now sedated/intubated  - EtOH +  - Sedation: Propofol 50  - Pain: dilaudid prn  - FU CTh + CTcspine read    Respiratory:  - PRVC 18/500/5/50  - Initial CXR seemingly wnl, FU read    Cardiovascular:  - No pressors cliff  - HDS, SBP 90-100s  - L radial Starr placed, MAP goal >65  - Lactate 4.8 > 3.3, monitor    Gastrointestinal/Nutrition:  - NPO    Genitourinary/Renal:  - No mIVF cliff, s/p 2L bolus  - Ravi in  - K 2.2, repleting    Hematologic:  - Hgb 11.2 > 13.7, stable  - Low threshold for 1u pRBC  - Monitor H/H  - Holding DVT ppx for now    Infectious Disease:  - Ancef (7/22 - ?)  - Monitor signs of infection    Endocrine:  - Continuous finger sticks    MSK:  - R ventral forearm with 6cm laceration to subcutaneous tissues, x5 additional 1-2cm lacerations, palpable radial/ulnar/DP/PT bilaterally, now s/p irrigation + primary closure  - FU AP pelvis + R forearm XR reads, R wrist and hand XR ordered    Disposition: SICU ASSESSMENT: 25 year-old male lvl 1 trauma presenting after punching a glass window and sustaining a R forearm laceration, tourniquet placed by EMS for c/f arterial bleed. Upon arrival was combative and thus sedated and intubated, GCS 14. Tourniquet removed, R ventral forearm with 6cm laceration to subcutaneous tissues, x5 additional 1-2cm lacerations, palpable radial/ulnar/DP/PT bilaterally. Also some small R knee and L anterior shin abrasions. S/p 2L bolus, HDS. SICU consulted for intubated/sedated state and higher care requirements. Bedside irrigation and closure of R forearm lacs performed by trauma surgery.    PLAN:   Neurologic:  - Agitated and combative expressing suicidal ideation upon arrival, now sedated/intubated  - EtOH +  - Sedation: Propofol 50  - Pain: dilaudid prn  - FU CTh + CTcspine read    Respiratory:  - PRVC 18/500/5/50  - Initial CXR w/ ETT proximal to mohsen >5cm, advanced and CXR repeated, fu read    Cardiovascular:  - Manjit 0.5  - HDS, SBP 90-100s  - L radial Starr placed, MAP goal >65  - Lactate 4.8 > 3.3, monitor    Gastrointestinal/Nutrition:  - NPO    Genitourinary/Renal:  - No mIVF cliff, s/p 2L bolus  - Ravi in  - K 2.2, repleting    Hematologic:  - Hgb 11.2 > 13.7, stable  - Low threshold for 1u pRBC  - Monitor H/H  - Holding DVT ppx for now    Infectious Disease:  - Ancef (7/22 - ?)  - Monitor signs of infection    Endocrine:  - Continuous finger sticks    MSK:  - R ventral forearm with 6cm laceration to subcutaneous tissues, x5 additional 1-2cm lacerations, palpable radial/ulnar/DP/PT bilaterally, now s/p irrigation + primary closure  - FU AP pelvis + R forearm XR reads, R wrist and hand XR ordered    Disposition: SICU
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
## 2023-07-22 NOTE — H&P ADULT - NSHPLABSRESULTS_GEN_ALL_CORE
.  LABS:                         11.2   7.95  )-----------( 249      ( 22 Jul 2023 03:26 )             34.4     07-22    148<H>  |  119<H>  |  9   ----------------------------<  87  2.2<LL>   |  13<L>  |  0.83    Ca    5.6<LL>      22 Jul 2023 03:26    TPro  4.2<L>  /  Alb  2.8<L>  /  TBili  <0.1<L>  /  DBili  x   /  AST  13  /  ALT  18  /  AlkPhos  43  07-22    PT/INR - ( 22 Jul 2023 03:26 )   PT: 25.7 sec;   INR: 2.20 ratio         PTT - ( 22 Jul 2023 03:26 )  PTT:33.7 sec  Urinalysis Basic - ( 22 Jul 2023 03:26 )    Color: x / Appearance: x / SG: x / pH: x  Gluc: 87 mg/dL / Ketone: x  / Bili: x / Urobili: x   Blood: x / Protein: x / Nitrite: x   Leuk Esterase: x / RBC: x / WBC x   Sq Epi: x / Non Sq Epi: x / Bacteria: x            RADIOLOGY, EKG & ADDITIONAL TESTS:   CT Head, C-Spine performed

## 2023-07-22 NOTE — AIRWAY REMOVAL NOTE  ADULT & PEDS - ARTIFICAL AIRWAY REMOVAL COMMENTS
Written order for extubation verified. The patient was identified by full name and birth date compared to the identification band. Present during the procedure was СВЕТЛАНА Virgen.

## 2023-07-22 NOTE — ED PROCEDURE NOTE - PROCEDURE ADDITIONAL DETAILS
Patient was preoxygenated. Induction and paralytic agents given (reference orders). Size 7.5 cuffed endotracheal tube (ETT) was advanced past the vocal cords into the trachea while under direct visualization. ETT was secured at 23cm at the lip and was secured to the patient. Postive end-tidal capnography was obtained. Endotracheal mist was observed. Bilateral breath sounds were heard. Tube placement was confirmed with CXR. -> ett advanced to 27cm based on cxr
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
## 2023-07-22 NOTE — ED PROVIDER NOTE - PHYSICAL EXAMINATION
A primary and secondary survey was performed.     Airway: oropharynx clear  Breathing: normal breath sounds bilaterally, pt phonating well  Circulation: pulses palpated in all 4 limbs, +R forearm deep laceration 6cm with muscle involvemnet, no pulsatile bleeding, NV intact distailly  Disability: Neuro intact / pupils equal round reactive.   Exposure: +C-collar    Gen: pt thrashing, trying to get out of the stretching, being combative, handcuffed  Head: NC,AT. No haley sign/raccoon eyes.   ENT: No hemoTM, oropharynx as above, no nasal hemorrhage.   Neck: as above.   Chest: Lung exam- CTAB, no ttp in chest wall.   Cardiac: RRR S1S2, No JVD.   Abd: soft, non-tender. Normal in color.   Pelvis: Stable.   Ext: +R forearm deep laceration 6cm with muscle involvemnet, no pulsatile bleeding, NV intact distailly  Skin: +R forearm lac  Neuro: GCS 14 , Moving 4 limbs, Speech fluid and clear

## 2023-07-22 NOTE — CHART NOTE - NSCHARTNOTEFT_GEN_A_CORE
The ICU surgery team evaluated the patient at bedside. He was asked whether he has any ideas, intent, or plans to hurt himself. The patient expressed understanding of the questions and denied any ideas, intent or plan to harm himself. He expressed certain difficulties in his personal life, however, they would not drive him to harm himself.

## 2023-07-22 NOTE — ED PROVIDER NOTE - CARE PLAN
1 Principal Discharge DX:	Laceration of arm, right, initial encounter  Secondary Diagnosis:	Altered mental status

## 2023-07-22 NOTE — H&P ADULT - NSHPPHYSICALEXAM_GEN_ALL_CORE
PHYSICAL EXAM:  GENERAL: Sedated, paralyzed  HEAD:  Atraumatic, Normocephalic  EYES: EOMI, PERRLA, conjunctiva and sclera clear  NECK:  C-collar in place  CHEST: No chest wall crepitus, clavicle and sternum intact   HEART: Tachycardic  RESPIRATORY: Intubated, CTA B/L  ABDOMEN: Soft, Nondistended  GROIN: Normal appearing, palpable femoral pulses bilaterally   BACK:  No palpable stepoff or deformity  NEUROLOGY: A&Ox3, nonfocal, moving all extremities prior to sedation  EXTREMITIES:  R ventral forearm with 6cm laceration to subcutaneous tissues, 5 additional 1-2cm lacerations. Small R knee and L anterior shin abrasions.   VASC:  Palp radial, DP, PT bilaterally   SKIN: warm, dry, normal color, no rash or abnormal lesions
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
## 2023-07-22 NOTE — ED PROVIDER NOTE - NS ED MD DISPO ADMITTING SERVICE
SURG Aklief counseling:  Patient advised to apply a pea-sized amount only at bedtime and wait 30 minutes after washing their face before applying.  If too drying, patient may add a non-comedogenic moisturizer.  The most commonly reported side effects including irritation, redness, scaling, dryness, stinging, burning, itching, and increased risk of sunburn.  The patient verbalized understanding of the proper use and possible adverse effects of retinoids.  All of the patient's questions and concerns were addressed.

## 2023-07-22 NOTE — ED PROVIDER NOTE - ATTENDING CONTRIBUTION TO CARE
Attending (Adiel Chilel D.O.): I have personally seen and examined this patient. I have performed a substantive portion of the visit including all aspects of the medical decision making. Resident, Fellow, and/or ACP note reviewed. I agree on the plan of care except where noted.    I have personally provided 31 minutes of critical care concurrently with the resident(s) and/or ACP(s), excluding time spent on separate procedures.    24M here via EMS with tourniquet on RUE 2/2 concern for arterial bleed, reported gcs 10, waxing and waning mental status BP 90/50. 2U emergent blood available bedside. Lv 1 trauma called. RSI meds drawn up. Upon eval of patient, very combating, in handcuffs, yelling, not cooperative. Airway and breathing intact. Unable to obtain BP 2/2 lack of cooperation. Unable to obtain vascular access or obtain vitals so joint decision made for intubation to facilitate medical screening, evaluation of wounds. RSI completed per eMAR, no active bleeding noted from right forearm laceration. 6cm in length. 5 other 1cm lacerations. No other injuries noted on primary or 2ndary survey. Additional versed needed for sedation as well as for control of agitated delirium. Additional pam given at trauma attg discretion while patient in CT scanner. Given 2g ancef, adacel. Plan for xrays , CTH, c-spine, to eval for ich. Admission to SICU for continued care.

## 2023-07-23 LAB
ANION GAP SERPL CALC-SCNC: 10 MMOL/L — SIGNIFICANT CHANGE UP (ref 5–17)
APPEARANCE UR: CLEAR — SIGNIFICANT CHANGE UP
BACTERIA # UR AUTO: NEGATIVE — SIGNIFICANT CHANGE UP
BILIRUB UR-MCNC: NEGATIVE — SIGNIFICANT CHANGE UP
BUN SERPL-MCNC: 9 MG/DL — SIGNIFICANT CHANGE UP (ref 7–23)
CALCIUM SERPL-MCNC: 8 MG/DL — LOW (ref 8.4–10.5)
CHLORIDE SERPL-SCNC: 104 MMOL/L — SIGNIFICANT CHANGE UP (ref 96–108)
CO2 SERPL-SCNC: 23 MMOL/L — SIGNIFICANT CHANGE UP (ref 22–31)
COLOR SPEC: SIGNIFICANT CHANGE UP
CREAT SERPL-MCNC: 1.03 MG/DL — SIGNIFICANT CHANGE UP (ref 0.5–1.3)
DIFF PNL FLD: ABNORMAL
EGFR: 103 ML/MIN/1.73M2 — SIGNIFICANT CHANGE UP
EPI CELLS # UR: 1 /HPF — SIGNIFICANT CHANGE UP
GLUCOSE SERPL-MCNC: 164 MG/DL — HIGH (ref 70–99)
GLUCOSE UR QL: NEGATIVE — SIGNIFICANT CHANGE UP
HCT VFR BLD CALC: 33.5 % — LOW (ref 39–50)
HGB BLD-MCNC: 11 G/DL — LOW (ref 13–17)
HYALINE CASTS # UR AUTO: 2 /LPF — SIGNIFICANT CHANGE UP (ref 0–2)
KETONES UR-MCNC: NEGATIVE — SIGNIFICANT CHANGE UP
LEUKOCYTE ESTERASE UR-ACNC: ABNORMAL
MAGNESIUM SERPL-MCNC: 1.5 MG/DL — LOW (ref 1.6–2.6)
MCHC RBC-ENTMCNC: 29.9 PG — SIGNIFICANT CHANGE UP (ref 27–34)
MCHC RBC-ENTMCNC: 32.8 GM/DL — SIGNIFICANT CHANGE UP (ref 32–36)
MCV RBC AUTO: 91 FL — SIGNIFICANT CHANGE UP (ref 80–100)
NITRITE UR-MCNC: NEGATIVE — SIGNIFICANT CHANGE UP
NRBC # BLD: 0 /100 WBCS — SIGNIFICANT CHANGE UP (ref 0–0)
PH UR: 6.5 — SIGNIFICANT CHANGE UP (ref 5–8)
PHOSPHATE SERPL-MCNC: 2.7 MG/DL — SIGNIFICANT CHANGE UP (ref 2.5–4.5)
PLATELET # BLD AUTO: 216 K/UL — SIGNIFICANT CHANGE UP (ref 150–400)
POTASSIUM SERPL-MCNC: 3.7 MMOL/L — SIGNIFICANT CHANGE UP (ref 3.5–5.3)
POTASSIUM SERPL-SCNC: 3.7 MMOL/L — SIGNIFICANT CHANGE UP (ref 3.5–5.3)
PROT UR-MCNC: SIGNIFICANT CHANGE UP
RBC # BLD: 3.68 M/UL — LOW (ref 4.2–5.8)
RBC # FLD: 13.7 % — SIGNIFICANT CHANGE UP (ref 10.3–14.5)
RBC CASTS # UR COMP ASSIST: 112 /HPF — HIGH (ref 0–4)
SODIUM SERPL-SCNC: 137 MMOL/L — SIGNIFICANT CHANGE UP (ref 135–145)
SP GR SPEC: 1.01 — SIGNIFICANT CHANGE UP (ref 1.01–1.02)
UROBILINOGEN FLD QL: NEGATIVE — SIGNIFICANT CHANGE UP
WBC # BLD: 13.29 K/UL — HIGH (ref 3.8–10.5)
WBC # FLD AUTO: 13.29 K/UL — HIGH (ref 3.8–10.5)
WBC UR QL: 14 /HPF — HIGH (ref 0–5)

## 2023-07-23 PROCEDURE — 99221 1ST HOSP IP/OBS SF/LOW 40: CPT

## 2023-07-23 PROCEDURE — 73110 X-RAY EXAM OF WRIST: CPT | Mod: 26,LT

## 2023-07-23 PROCEDURE — 73090 X-RAY EXAM OF FOREARM: CPT | Mod: 26,LT

## 2023-07-23 PROCEDURE — 99233 SBSQ HOSP IP/OBS HIGH 50: CPT

## 2023-07-23 PROCEDURE — 73120 X-RAY EXAM OF HAND: CPT | Mod: 26,LT

## 2023-07-23 RX ORDER — MAGNESIUM SULFATE 500 MG/ML
2 VIAL (ML) INJECTION ONCE
Refills: 0 | Status: COMPLETED | OUTPATIENT
Start: 2023-07-23 | End: 2023-07-23

## 2023-07-23 RX ORDER — TRAMADOL HYDROCHLORIDE 50 MG/1
50 TABLET ORAL EVERY 4 HOURS
Refills: 0 | Status: DISCONTINUED | OUTPATIENT
Start: 2023-07-23 | End: 2023-07-24

## 2023-07-23 RX ORDER — ACETAMINOPHEN 500 MG
975 TABLET ORAL EVERY 6 HOURS
Refills: 0 | Status: DISCONTINUED | OUTPATIENT
Start: 2023-07-23 | End: 2023-07-24

## 2023-07-23 RX ORDER — MAGNESIUM SULFATE 500 MG/ML
4 VIAL (ML) INJECTION ONCE
Refills: 0 | Status: DISCONTINUED | OUTPATIENT
Start: 2023-07-23 | End: 2023-07-23

## 2023-07-23 RX ORDER — POTASSIUM PHOSPHATE, MONOBASIC POTASSIUM PHOSPHATE, DIBASIC 236; 224 MG/ML; MG/ML
30 INJECTION, SOLUTION INTRAVENOUS ONCE
Refills: 0 | Status: COMPLETED | OUTPATIENT
Start: 2023-07-23 | End: 2023-07-23

## 2023-07-23 RX ORDER — THIAMINE MONONITRATE (VIT B1) 100 MG
100 TABLET ORAL DAILY
Refills: 0 | Status: DISCONTINUED | OUTPATIENT
Start: 2023-07-23 | End: 2023-07-24

## 2023-07-23 RX ADMIN — Medication 975 MILLIGRAM(S): at 09:00

## 2023-07-23 RX ADMIN — CHLORHEXIDINE GLUCONATE 1 APPLICATION(S): 213 SOLUTION TOPICAL at 05:03

## 2023-07-23 RX ADMIN — Medication 25 GRAM(S): at 04:05

## 2023-07-23 RX ADMIN — Medication 100 MILLIGRAM(S): at 05:01

## 2023-07-23 RX ADMIN — Medication 100 MILLIGRAM(S): at 13:32

## 2023-07-23 RX ADMIN — TRAMADOL HYDROCHLORIDE 25 MILLIGRAM(S): 50 TABLET ORAL at 03:40

## 2023-07-23 RX ADMIN — POTASSIUM PHOSPHATE, MONOBASIC POTASSIUM PHOSPHATE, DIBASIC 83.33 MILLIMOLE(S): 236; 224 INJECTION, SOLUTION INTRAVENOUS at 03:12

## 2023-07-23 RX ADMIN — Medication 100 MILLIGRAM(S): at 13:33

## 2023-07-23 RX ADMIN — Medication 1000 MILLIGRAM(S): at 00:25

## 2023-07-23 RX ADMIN — TRAMADOL HYDROCHLORIDE 25 MILLIGRAM(S): 50 TABLET ORAL at 03:10

## 2023-07-23 RX ADMIN — Medication 100 MILLIGRAM(S): at 21:40

## 2023-07-23 RX ADMIN — Medication 975 MILLIGRAM(S): at 08:23

## 2023-07-23 RX ADMIN — Medication 975 MILLIGRAM(S): at 18:06

## 2023-07-23 RX ADMIN — Medication 25 GRAM(S): at 02:37

## 2023-07-23 NOTE — BH CONSULTATION LIAISON ASSESSMENT NOTE - NSBHCONSULTFOLLOWAFTERCARE_PSY_A_CORE FT
Can follow up with [facility] crisis center on corner of [address] in [place] ([phone]) if needed Can follow up with [facility] crisis center on corner of [address] in [place] ([phone]) if needed; information also provided to pt's mother

## 2023-07-23 NOTE — BH CONSULTATION LIAISON ASSESSMENT NOTE - NSBHCONSULTPRIMARYDISCUSSYES_PSY_A_CORE FT
Discussed w/ Dr. Smith that pt does not meet criteria for inpatient psychiatric admission, does not require 1 to 1 observation at this time.
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
## 2023-07-23 NOTE — BH CONSULTATION LIAISON ASSESSMENT NOTE - DESCRIPTION
Domiciled in shelter in [place], but has family nearby. His mother and aunt live in [place]. Been on and off with his current girlfriend for about 5 years. States he drinks when they are together, does not elaborate on how much he drinks. He is , and has a daughter who lives in a different state who is 12 years old, it is unclear if the daughter is from his ex-wife or from a different relationship. Denies smoking.
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
## 2023-07-23 NOTE — PROGRESS NOTE ADULT - ATTENDING COMMENTS
7/22/2023 - bedside repair of right forearm volar superficial lacerations    altered mental status  -intubated and successfully extubated on 7/22    alcohol abuse  -no evidence of alcohol withdrawal, so will transfer to floor  -social work evaluation
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
## 2023-07-23 NOTE — BH CONSULTATION LIAISON ASSESSMENT NOTE - NSCOMMENTSUICRISKFACT_PSY_ALL_CORE
Lives in shelter, involved in a relationship where both parties encourage each other to drink alcohol to excess

## 2023-07-23 NOTE — OCCUPATIONAL THERAPY INITIAL EVALUATION ADULT - LIVES WITH, PROFILE
Pt reports living alone in shelter with elevator access. Pt reports independence with all aspects of self care and functional mobility without AD PTA.

## 2023-07-23 NOTE — BH CONSULTATION LIAISON ASSESSMENT NOTE - CURRENT MEDICATION
MEDICATIONS  (STANDING):  ceFAZolin   IVPB      ceFAZolin   IVPB 2000 milliGRAM(s) IV Intermittent every 8 hours  thiamine 100 milliGRAM(s) Oral daily    MEDICATIONS  (PRN):  acetaminophen     Tablet .. 975 milliGRAM(s) Oral every 6 hours PRN Mild Pain (1 - 3)  traMADol 50 milliGRAM(s) Oral every 4 hours PRN Severe Pain (7 - 10)  traMADol 25 milliGRAM(s) Oral every 4 hours PRN Moderate Pain (4 - 6)
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
## 2023-07-23 NOTE — BH CONSULTATION LIAISON ASSESSMENT NOTE - NSBHCONSULTRECOMMENDOTHER_PSY_A_CORE FT
- Routine observation sufficient, as pt at low acute risk of suicide, denying SI now and endorsed SI only while under the influence of alcohol.   - Does not meet criteria for inpatient psychiatric admission at this time.   - Pt and mother counseled on how sobriety is protective, as both pt and pt's mother acknowledge that pt is more at risk of self harm and harm to others when he is drinking.
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
## 2023-07-23 NOTE — BH CONSULTATION LIAISON ASSESSMENT NOTE - NSBHCHARTREVIEWVS_PSY_A_CORE FT
Vital Signs Last 24 Hrs  T(C): 36.7 (23 Jul 2023 10:15), Max: 38.3 (22 Jul 2023 23:00)  T(F): 98.1 (23 Jul 2023 10:15), Max: 100.9 (22 Jul 2023 23:00)  HR: 79 (23 Jul 2023 14:48) (73 - 103)  BP: 103/63 (23 Jul 2023 14:48) (92/56 - 115/57)  BP(mean): 68 (23 Jul 2023 08:00) (68 - 79)  RR: 18 (23 Jul 2023 10:15) (13 - 23)  SpO2: 96% (23 Jul 2023 14:48) (94% - 98%)    Parameters below as of 23 Jul 2023 14:48  Patient On (Oxygen Delivery Method): room air

## 2023-07-23 NOTE — BH CONSULTATION LIAISON ASSESSMENT NOTE - MSE UNSTRUCTURED FT
The patient appears stated age, fair hygiene, dressed appropriately. He was calm, cooperative with the interview. He maintained appropriate eye contact. No psychomotor agitation or retardation. The patient's speech was fluent, low in tone, rate, and volume. The patient's mood is "alright." Affect is constricted, stable, appropriate. The patient's thoughts are goal directed. He denies any suicidal or homicidal ideation, intent, or plan on this interview. Insight is fair. Judgement is fair. Impulse control is poor.  The patient appears stated age, fair hygiene, dressed appropriately. He was calm, cooperative with the interview. He maintained appropriate eye contact. No psychomotor agitation or retardation. The patient's speech was fluent, low in tone, rate, and volume. The patient's mood is "alright." Affect is constricted, stable, appropriate. The patient's thoughts are goal directed. He denies any suicidal or homicidal ideation, intent, or plan on this interview. Insight is fair. Judgement is fair.

## 2023-07-24 ENCOUNTER — TRANSCRIPTION ENCOUNTER (OUTPATIENT)
Age: 28
End: 2023-07-24

## 2023-07-24 VITALS
RESPIRATION RATE: 18 BRPM | DIASTOLIC BLOOD PRESSURE: 65 MMHG | SYSTOLIC BLOOD PRESSURE: 109 MMHG | TEMPERATURE: 98 F | HEART RATE: 77 BPM | OXYGEN SATURATION: 98 %

## 2023-07-24 LAB
ANION GAP SERPL CALC-SCNC: 7 MMOL/L — SIGNIFICANT CHANGE UP (ref 5–17)
BUN SERPL-MCNC: 8 MG/DL — SIGNIFICANT CHANGE UP (ref 7–23)
CALCIUM SERPL-MCNC: 8.2 MG/DL — LOW (ref 8.4–10.5)
CHLORIDE SERPL-SCNC: 106 MMOL/L — SIGNIFICANT CHANGE UP (ref 96–108)
CO2 SERPL-SCNC: 26 MMOL/L — SIGNIFICANT CHANGE UP (ref 22–31)
CREAT SERPL-MCNC: 0.98 MG/DL — SIGNIFICANT CHANGE UP (ref 0.5–1.3)
CULTURE RESULTS: SIGNIFICANT CHANGE UP
EGFR: 107 ML/MIN/1.73M2 — SIGNIFICANT CHANGE UP
GLUCOSE SERPL-MCNC: 125 MG/DL — HIGH (ref 70–99)
HCT VFR BLD CALC: 31.4 % — LOW (ref 39–50)
HGB BLD-MCNC: 10.2 G/DL — LOW (ref 13–17)
MAGNESIUM SERPL-MCNC: 1.7 MG/DL — SIGNIFICANT CHANGE UP (ref 1.6–2.6)
MCHC RBC-ENTMCNC: 29.7 PG — SIGNIFICANT CHANGE UP (ref 27–34)
MCHC RBC-ENTMCNC: 32.5 GM/DL — SIGNIFICANT CHANGE UP (ref 32–36)
MCV RBC AUTO: 91.5 FL — SIGNIFICANT CHANGE UP (ref 80–100)
NRBC # BLD: 0 /100 WBCS — SIGNIFICANT CHANGE UP (ref 0–0)
PHOSPHATE SERPL-MCNC: 3 MG/DL — SIGNIFICANT CHANGE UP (ref 2.5–4.5)
PLATELET # BLD AUTO: 202 K/UL — SIGNIFICANT CHANGE UP (ref 150–400)
POTASSIUM SERPL-MCNC: 3.8 MMOL/L — SIGNIFICANT CHANGE UP (ref 3.5–5.3)
POTASSIUM SERPL-SCNC: 3.8 MMOL/L — SIGNIFICANT CHANGE UP (ref 3.5–5.3)
RBC # BLD: 3.43 M/UL — LOW (ref 4.2–5.8)
RBC # FLD: 13.8 % — SIGNIFICANT CHANGE UP (ref 10.3–14.5)
SODIUM SERPL-SCNC: 139 MMOL/L — SIGNIFICANT CHANGE UP (ref 135–145)
SPECIMEN SOURCE: SIGNIFICANT CHANGE UP
WBC # BLD: 8.41 K/UL — SIGNIFICANT CHANGE UP (ref 3.8–10.5)
WBC # FLD AUTO: 8.41 K/UL — SIGNIFICANT CHANGE UP (ref 3.8–10.5)

## 2023-07-24 RX ORDER — TRAMADOL HYDROCHLORIDE 50 MG/1
0.5 TABLET ORAL
Qty: 8 | Refills: 0
Start: 2023-07-24

## 2023-07-24 RX ADMIN — TRAMADOL HYDROCHLORIDE 25 MILLIGRAM(S): 50 TABLET ORAL at 01:39

## 2023-07-24 RX ADMIN — TRAMADOL HYDROCHLORIDE 25 MILLIGRAM(S): 50 TABLET ORAL at 11:18

## 2023-07-24 RX ADMIN — Medication 975 MILLIGRAM(S): at 04:00

## 2023-07-24 RX ADMIN — Medication 975 MILLIGRAM(S): at 09:27

## 2023-07-24 RX ADMIN — Medication 100 MILLIGRAM(S): at 05:10

## 2023-07-24 RX ADMIN — TRAMADOL HYDROCHLORIDE 25 MILLIGRAM(S): 50 TABLET ORAL at 02:30

## 2023-07-24 RX ADMIN — Medication 975 MILLIGRAM(S): at 10:27

## 2023-07-24 RX ADMIN — Medication 100 MILLIGRAM(S): at 13:04

## 2023-07-24 RX ADMIN — Medication 100 MILLIGRAM(S): at 11:18

## 2023-07-24 RX ADMIN — TRAMADOL HYDROCHLORIDE 25 MILLIGRAM(S): 50 TABLET ORAL at 12:18

## 2023-07-24 RX ADMIN — Medication 975 MILLIGRAM(S): at 03:07

## 2023-07-24 NOTE — DISCHARGE NOTE NURSING/CASE MANAGEMENT/SOCIAL WORK - NSDCFUADDAPPT_GEN_ALL_CORE_FT
- You can follow up with [facility] crisis center on corner of [address] in [place] ([phone]) if needed

## 2023-07-24 NOTE — DISCHARGE NOTE PROVIDER - NSDCCPCAREPLAN_GEN_ALL_CORE_FT
PRINCIPAL DISCHARGE DIAGNOSIS  Diagnosis: Laceration of arm, right, initial encounter  Assessment and Plan of Treatment: WOUND CARE: Your wound is closed with nylon sutures. These are non-dissolvable and need to be removed in clinic.  BATHING: Please do not submerge wound underwater. You may shower and/or sponge bathe.  ACTIVITY: No heavy lifting anything more than 10-15lbs or straining. Otherwise, you may return to your usual level of physical activity. If you are taking narcotic pain medication (such as Percocet), do NOT drive a car, operate machinery or make important decisions.  DIET: Regular diet  NOTIFY YOUR SURGEON IF: You have any bleeding that does not stop, any pus draining from your wound, any fever (over 100.4 F) or chills, persistent nausea/vomiting with inability to tolerate food or liquids, persistent diarrhea, or if your pain is not controlled on your discharge pain medications.  FOLLOW-UP:  1. Please follow up with your primary care physician in one week regarding your hospitalization.  2. Please follow up with [facility] surgery team in 2 weeks for suture removal.        SECONDARY DISCHARGE DIAGNOSES  Diagnosis: Altered mental status  Assessment and Plan of Treatment:      PRINCIPAL DISCHARGE DIAGNOSIS  Diagnosis: Laceration of arm, right, initial encounter  Assessment and Plan of Treatment: WOUND CARE: Your wound is closed with nylon sutures. These are non-dissolvable and need to be removed in clinic.  BATHING: Please do not submerge wound underwater. You may shower and/or sponge bathe.  ACTIVITY: No heavy lifting anything more than 10-15lbs or straining. Otherwise, you may return to your usual level of physical activity. If you are taking narcotic pain medication (such as oxycodone), do NOT drive a car, operate machinery or make important decisions.  DIET: Regular diet  NOTIFY YOUR SURGEON IF: You have any bleeding that does not stop, any pus draining from your wound, any fever (over 100.4 F) or chills, persistent nausea/vomiting with inability to tolerate food or liquids, persistent diarrhea, or if your pain is not controlled on your discharge pain medications.  FOLLOW-UP:  1. Please follow up with your primary care physician in one week regarding your hospitalization.  2. Please follow up with [facility] surgery team in 2 weeks for suture removal.        SECONDARY DISCHARGE DIAGNOSES  Diagnosis: Altered mental status  Assessment and Plan of Treatment:

## 2023-07-24 NOTE — DISCHARGE NOTE PROVIDER - CARE PROVIDER_API CALL
Aureliano Cunningham Dic[facility]  Surgery  [address] [place] [id number]  Phone: [phone]  Fax: [phone]  Follow Up Time: 2 weeks

## 2023-07-24 NOTE — DISCHARGE NOTE PROVIDER - NSDCFUADDAPPT_GEN_ALL_CORE_FT
- You can follow up with [facility] crisis center on corner of [address] in [place] ([phone]) if needed; information also provided to pt's mother   - You can follow up with [facility] crisis center on corner of [address] in [place] ([phone]) if needed

## 2023-07-24 NOTE — DISCHARGE NOTE NURSING/CASE MANAGEMENT/SOCIAL WORK - PATIENT PORTAL LINK FT
You can access the FollowMyHealth Patient Portal offered by [facility] by registering at the following website: [email or web address]. By joining [employer]’s FollowMyHealth portal, you will also be able to view your health information using other applications (apps) compatible with our system.

## 2023-07-24 NOTE — DISCHARGE NOTE NURSING/CASE MANAGEMENT/SOCIAL WORK - NSDCPEFALRISK_GEN_ALL_CORE
For information on Fall & Injury Prevention, visit: [email or web address] OR  [email or web address] OR  https://www.cdc.gov/steadi/patient.html
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
## 2023-07-24 NOTE — PROGRESS NOTE ADULT - SUBJECTIVE AND OBJECTIVE BOX
SURGERY    Pt seen and examined. Pt denies any overnight events. Pt reports pain is well controlled.  Pt downgraded from SICU    ICU Vital Signs Last 24 Hrs  T(C): 36.7 (2023 10:15), Max: 38.3 (2023 23:00)  T(F): 98.1 (2023 10:15), Max: 100.9 (2023 23:00)  HR: 73 (2023 10:15) (72 - 103)  BP: 98/54 (2023 10:15) (92/56 - 115/57)  BP(mean): 68 (2023 08:00) (68 - 79)  ABP: 99/49 (2023 18:00) (98/44 - 143/79)  ABP(mean): 63 (2023 18:00) (59 - 97)  RR: 18 (2023 10:15) (11 - 33)  SpO2: 96% (2023 10:15) (94% - 100%)      I&O's Detail    2023 07:01  -  2023 07:00  --------------------------------------------------------  IN:    Dexmedetomidine: 30.1 mL    dextrose 5% + lactated ringers: 1250 mL    IV PiggyBack: 500 mL    IV PiggyBack: 499.8 mL    Oral Fluid: 250 mL    Phenylephrine: 33.2 mL    Propofol: 151 mL  Total IN: 2714.1 mL    OUT:    Indwelling Catheter - Urethral (mL): 805 mL    Voided (mL): 1400 mL  Total OUT: 2205 mL    Total NET: 509.1 mL      2023 07:01  -  2023 11:25  --------------------------------------------------------  IN:    IV PiggyBack: 83.3 mL    Oral Fluid: 150 mL  Total IN: 233.3 mL    OUT:    Voided (mL): 825 mL  Total OUT: 825 mL    Total NET: -591.7 mL      Physical exam:   Gen - Pt sitting comfortably in bed in NAD  Chest- CTA bilaterally   CV- S1 & S2, RRR  Abdomen- Soft, non-tender, non-distended  Ext - right forearm dressed and wrapped, clean, intact sensation, normal cap refill, normal motion at shoulder and elbow, decreased strength in hand 2/2 pain    LABS:                        11.0   13.29 )-----------( 216      ( 2023 00:44 )             33.5     07-23    137  |  104  |  9   ----------------------------<  164<H>  3.7   |  23  |  1.03    Ca    8.0<L>      2023 00:45  Phos  2.7     07-23  Mg     1.5     07-23    TPro  4.2<L>  /  Alb  2.8<L>  /  TBili  <0.1<L>  /  DBili  x   /  AST  13  /  ALT  18  /  AlkPhos  43  07-22    PT/INR - ( 2023 03:26 )   PT: 25.7 sec;   INR: 2.20 ratio         PTT - ( 2023 03:26 )  PTT:33.7 sec  Urinalysis Basic - ( 2023 03:06 )    Color: Light Yellow / Appearance: Clear / S.014 / pH: x  Gluc: x / Ketone: Negative  / Bili: Negative / Urobili: Negative   Blood: x / Protein: Trace / Nitrite: Negative   Leuk Esterase: Large / RBC: 112 /hpf / WBC 14 /HPF   Sq Epi: x / Non Sq Epi: x / Bacteria: Negative      Assessment/Plan:   24-year-old male unknown PMH presenting as a level 1 trauma after punching a window. Injuries thus far include multiple laceration to R volar forearm, including 6cm laceration with exposed subcutaneous tissue and muscle s/p bedside lac repair    - pending  consult  -Continue DVT Prophylaxis with SCD's  -Continue IV Antibiotics ancef (f/u ID for duration)  -Encourage IS  -Continue analgesia  -Encourage OOB/ambulation with assistance  -Diet: regular  -Above discussed with Dr. Cunningham    [id number]
SURGERY DAILY PROGRESS NOTE:     Overnight Events:  No acute events overnight.    SUBJECTIVE: Patient seen and evaluated on AM rounds. Pt is resting comfortably in bed with no complaints. Denies fever, chills, N/V, chest pain, or shortness of breath. Tolerating diet. Ambulating well. Pain is adequately controlled on current regimen. Able to move right arm with some soreness.    OBJECTIVE:  Vital Signs Last 24 Hrs  T(C): 36.7 (24 Jul 2023 04:10), Max: 36.7 (23 Jul 2023 10:15)  T(F): 98.1 (24 Jul 2023 04:10), Max: 98.1 (23 Jul 2023 10:15)  HR: 68 (24 Jul 2023 04:10) (68 - 79)  BP: 119/74 (24 Jul 2023 04:10) (98/54 - 119/74)  BP(mean): --  RR: 18 (24 Jul 2023 04:10) (18 - 18)  SpO2: 96% (24 Jul 2023 04:10) (96% - 98%)    Parameters below as of 24 Jul 2023 04:10  Patient On (Oxygen Delivery Method): room air      I&O's Detail    23 Jul 2023 07:01  -  24 Jul 2023 07:00  --------------------------------------------------------  IN:    IV PiggyBack: 83.3 mL    Oral Fluid: 800 mL  Total IN: 883.3 mL    OUT:    Voided (mL): 825 mL  Total OUT: 825 mL    Total NET: 58.3 mL        Daily     Daily     LABS:                        10.2   8.41  )-----------( 202      ( 24 Jul 2023 07:39 )             31.4     07-24    139  |  106  |  8   ----------------------------<  125<H>  3.8   |  26  |  0.98    Ca    8.2<L>      24 Jul 2023 07:39  Phos  3.0     07-24  Mg     1.7     07-24        Urinalysis Basic - ( 24 Jul 2023 07:39 )    Color: x / Appearance: x / SG: x / pH: x  Gluc: 125 mg/dL / Ketone: x  / Bili: x / Urobili: x   Blood: x / Protein: x / Nitrite: x   Leuk Esterase: x / RBC: x / WBC x   Sq Epi: x / Non Sq Epi: x / Bacteria: x        PHYSICAL EXAM:  Gen - Pt laying comfortably in bed in NAD  Chest- CTA bilaterally   CV- S1 & S2, RRR  Abdomen- Soft, non-tender, non-distended  Ext - right forearm dressed and wrapped, clean, intact sensation, normal cap refill, normal motion at shoulder and elbow, decreased strength in hand 2/2 pain
24 HOUR EVENTS:    SUBJECTIVE/ROS:  [ ] A ten-point review of systems was otherwise negative except as noted.  [ ] Due to altered mental status/intubation, subjective information were not able to be obtained from the patient. History was obtained, to the extent possible, from review of the chart and collateral sources of information.    NEURO  Exam: AO4  Meds: acetaminophen     Tablet .. 650 milliGRAM(s) Oral every 6 hours PRN Mild Pain (1 - 3)  traMADol 25 milliGRAM(s) Oral every 4 hours PRN Moderate Pain (4 - 6)  traMADol 50 milliGRAM(s) Oral every 4 hours PRN Severe Pain (7 - 10)    [x] Adequacy of sedation and pain control has been assessed and adjusted    RESPIRATORY  RR: 20 (07-23-23 @ 04:00) (11 - 38)  SpO2: 96% (07-23-23 @ 04:00) (94% - 100%)  Wt(kg): --  Exam: CTA b/l. No murmurs, rubs, gallops appreciated.   Mechanical Ventilation: Mode: CPAP with PS, RR (patient): 18, FiO2: 40, PEEP: 5, PS: 5, MAP: 7, PIP: 11  ABG - ( 22 Jul 2023 13:40 )  pH: 7.36  /  pCO2: 49    /  pO2: 171   / HCO3: 28    / Base Excess: 1.5   /  SaO2: 99.7    Lactate: x        [ ] Extubation Readiness Assessed  Meds:     CARDIOVASCULAR  HR: 88 (07-23-23 @ 04:00) (65 - 104)  BP: 110/58 (07-23-23 @ 04:00) (87/38 - 124/76)  BP(mean): 75 (07-23-23 @ 04:00) (55 - 95)  ABP: 99/49 (07-22-23 @ 18:00) (55/38 - 145/92)  ABP(mean): 63 (07-22-23 @ 18:00) (54 - 112)  Wt(kg): --  CVP(cm H2O): --  VBG - ( 22 Jul 2023 03:57 )  pH: 7.29  /  pCO2: 47    /  pO2: 72    / HCO3: 23    / Base Excess: -4.2  /  SaO2: 93.9   Lactate: 3.3      Exam: S1S2. No murmurs, rubs, gallops appreciated.  Cardiac Rhythm: NSR    GI/NUTRITION  Exam: Soft, non-distended, non-tender.   Diet:  Meds:     GENITOURINARY  I&O's Detail    07-21 @ 07:01 - 07-22 @ 07:00  --------------------------------------------------------  IN:    dextrose 5% + lactated ringers: 250 mL    IV PiggyBack: 250 mL    Lactated Ringers Bolus: 1000 mL    Phenylephrine: 34.8 mL    Propofol: 120.8 mL  Total IN: 1655.6 mL    OUT:    Indwelling Catheter - Urethral (mL): 220 mL  Total OUT: 220 mL    Total NET: 1435.6 mL      07-22 @ 07:01 - 07-23 @ 04:26  --------------------------------------------------------  IN:    Dexmedetomidine: 30.1 mL    dextrose 5% + lactated ringers: 1250 mL    IV PiggyBack: 249.9 mL    IV PiggyBack: 450 mL    Oral Fluid: 250 mL    Phenylephrine: 33.2 mL    Propofol: 151 mL  Total IN: 2414.2 mL    OUT:    Indwelling Catheter - Urethral (mL): 805 mL    Voided (mL): 1400 mL  Total OUT: 2205 mL    Total NET: 209.2 mL    07-23    137  |  104  |  9   ----------------------------<  164<H>  3.7   |  23  |  1.03    Ca    8.0<L>      23 Jul 2023 00:45  Phos  2.7     07-23  Mg     1.5     07-23    TPro  4.2<L>  /  Alb  2.8<L>  /  TBili  <0.1<L>  /  DBili  x   /  AST  13  /  ALT  18  /  AlkPhos  43  07-22    [ ] Ravi catheter, indication: N/A  Meds: thiamine Injectable 100 milliGRAM(s) IV Push daily    HEMATOLOGIC  Meds:   [x] VTE Prophylaxis                        11.0   13.29 )-----------( 216      ( 23 Jul 2023 00:44 )             33.5     PT/INR - ( 22 Jul 2023 03:26 )   PT: 25.7 sec;   INR: 2.20 ratio         PTT - ( 22 Jul 2023 03:26 )  PTT:33.7 sec  Transfusion     [ ] PRBC   [ ] Platelets   [ ] FFP   [ ] Cryoprecipitate      INFECTIOUS DISEASES  T(C): 36.9 (07-23-23 @ 03:00), Max: 38.3 (07-22-23 @ 23:00)  Wt(kg): --  WBC Count: 13.29 K/uL (07-23 @ 00:44)    Recent Cultures:    Meds: ceFAZolin   IVPB      ceFAZolin   IVPB 2000 milliGRAM(s) IV Intermittent every 8 hours  diphtheria/tetanus/pertussis (acellular) Vaccine (Adacel) 0.5 milliLiter(s) IntraMuscular once    ENDOCRINE  Capillary Blood Glucose    Meds:     ACCESS DEVICES:  [ ] Peripheral IV  [ ] Central Venous Line	[ ] R	[ ] L	[ ] IJ	[ ] Fem	[ ] SC	Placed:   [ ] Arterial Line		[ ] R	[ ] L	[ ] Fem	[ ] Rad	[ ] Ax	Placed:   [ ] PICC:					[ ] Mediport  [ ] Urinary Catheter, Date Placed:   [ ] Necessity of urinary, arterial, and venous catheters discussed    OTHER MEDICATIONS:  chlorhexidine 2% Cloths 1 Application(s) Topical <User Schedule>      CODE STATUS:     IMAGING:

## 2023-07-24 NOTE — DISCHARGE NOTE PROVIDER - NSFOLLOWUPCLINICS_GEN_ALL_ED_FT
[facility]  Psychiatry  [address] [place] [address]  Phone: [phone]  Fax:

## 2023-07-24 NOTE — PROGRESS NOTE ADULT - ASSESSMENT
24-year-old male unknown PMH presenting as a level 1 trauma after punching a window. Injuries thus far include multiple laceration to R volar forearm, including 6cm laceration with exposed subcutaneous tissue and muscle s/p bedside lac repair.     -  consult- Routine observation sufficient, as pt at low acute risk of suicide, denying SI now and endorsed SI only while under the influence of alcohol. Does not meet criteria for inpatient psychiatric admission at this time.   -Continue DVT Prophylaxis with SCD's  -Continue IV Antibiotics ancef (f/u ID for duration)  -Continue analgesia  -Encourage OOB/ambulation with assistance  -Diet: regular  -OT rec- Outpatient OT  -Dispo- Discharge planning    ACS Team  [id number]

## 2023-07-28 LAB
CULTURE RESULTS: SIGNIFICANT CHANGE UP
CULTURE RESULTS: SIGNIFICANT CHANGE UP
SPECIMEN SOURCE: SIGNIFICANT CHANGE UP
SPECIMEN SOURCE: SIGNIFICANT CHANGE UP

## 2023-08-09 ENCOUNTER — APPOINTMENT (OUTPATIENT)
Dept: TRAUMA SURGERY | Facility: CLINIC | Age: 28
End: 2023-08-09
Payer: MEDICAID

## 2023-08-09 DIAGNOSIS — S41.111D LACERATION W/OUT FOREIGN BODY OF RIGHT UPPER ARM, SUBSEQUENT ENCOUNTER: ICD-10-CM

## 2023-08-09 PROBLEM — Z00.00 ENCOUNTER FOR PREVENTIVE HEALTH EXAMINATION: Status: ACTIVE | Noted: 2023-08-09

## 2023-08-09 PROCEDURE — 99202 OFFICE O/P NEW SF 15 MIN: CPT

## 2023-08-09 PROCEDURE — 99212 OFFICE O/P EST SF 10 MIN: CPT

## 2023-08-09 NOTE — REASON FOR VISIT
Caller: ALEX HUANG    Relationship to patient: SELF    Best call back number: [phone]    Patient is needing: PATIENT WANTED TO CALL FOR HIS MRI RESULTS FOR HIS NECK, SHOULDER, AND ARMS.  PATIENT HAD A DEATH IN THE FAMILY AND NEEDS TO HEAR BACK REGARDING THE RESULTS.          
Patient called requesting Mri results from 4/6/23. Result report available from [facility] are scanned in patient media tab. Patient will be out of town beginning Monday, hoping for a call today to discuss.   
[Follow-Up: _____] : a [unfilled] follow-up visit

## 2023-08-09 NOTE — HISTORY OF PRESENT ILLNESS
[FreeTextEntry1] : Mr. Villegas is a 28y/o man who came to the ED on July 22 with lacerations to his anterior right forearm from glass shards. He had laceration exploration and closure. He was briefly intubated for agitation, but was extubated quickly. He returns for followup. He notes that his strength in the right arm and hand is normal, he has some mild numbness anterior right forearm distal to the main laceration, no tingling or pain. Otherwise feeling well.  Right anterior forearm: two small lacerations more proximally with silk sutures (less than 1cm each) and a longer laceration more distally approx 5cm long with nylon sutures. All wounds are closed, well-healed, without erythema, induration, fluctuance, or drainage. Sensation to the hand is normal, sensation on anterior forearm just distal to the lacerations is subjectively decreased but patient is able to feel fine touch.  strength of right hand is normal.  Sutures removed.   - I discussed with patient that mild numbness is likely due to nerve injury, and that this is most likely to improve over time, although it may take months, but that sometimes nerve injury is permanent. - Return to office as needed

## 2024-01-25 PROBLEM — Z78.9 OTHER SPECIFIED HEALTH STATUS: Chronic | Status: ACTIVE | Noted: 2022-07-03

## 2024-04-29 NOTE — ED PROVIDER NOTE - WET READ LAUNCH FT
-- DO NOT REPLY / DO NOT REPLY ALL --  -- Message is from [facility]agement [facility] [employer] ([facility]) --    Order Request  Labs for high sugar. Per son patient went to see oncologist last week and oncologist stated patients sugar was high. Son requesting labs for sugar test.     Message / reason: had high sugar at oncologist appointment at 285 and prior to that 225.     Insurance type: [facility]  Payor: MEDICARE / Plan: PARTA AND B / Product Type: MEDICARE    Preferred Delivery Method   Call when ready for pickup - phone number to notify: [phone]  and Input in Epic     Caller Information         Type Contact Phone/Fax    04/29/2024 12:37 PM CDT Phone (Incoming) Mayra Mcleod (Emergency Contact) [phone]            Alternative phone number: na    Can a detailed message be left? Yes    Message Turnaround:        There are no Wet Read(s) to document.

## 2024-12-27 NOTE — H&P ADULT - VTE RISK ASSESSMENT
Reason for call:   [x] Refill   [] Prior Auth  [] Other:     Office:   [x] PCP/Provider -   [] Specialty/Provider -     Medication:   levothyroxine 88 mcg/Take 1 tablet (88 mcg total) by mouth daily     hydrochlorothiazide (HYDRODIURIL) 25 mg/Take 1 tablet (25 mg total) by mouth daily     Quantity: 90    Pharmacy: [facility] - [place], [place] - [address]     Does the patient have enough for 3 days?   [x] Yes   [] No - Send as HP to POD    
<<--- Click to launch